# Patient Record
Sex: FEMALE
[De-identification: names, ages, dates, MRNs, and addresses within clinical notes are randomized per-mention and may not be internally consistent; named-entity substitution may affect disease eponyms.]

---

## 2018-01-26 ENCOUNTER — HOSPITAL ENCOUNTER (EMERGENCY)
Dept: HOSPITAL 14 - H.ER | Age: 38
Discharge: HOME | End: 2018-01-26
Payer: COMMERCIAL

## 2018-01-26 VITALS
OXYGEN SATURATION: 98 % | SYSTOLIC BLOOD PRESSURE: 121 MMHG | DIASTOLIC BLOOD PRESSURE: 75 MMHG | TEMPERATURE: 97.5 F | RESPIRATION RATE: 16 BRPM

## 2018-01-26 VITALS — HEART RATE: 93 BPM

## 2018-01-26 DIAGNOSIS — M54.9: ICD-10-CM

## 2018-01-26 DIAGNOSIS — O26.892: ICD-10-CM

## 2018-01-26 DIAGNOSIS — M79.1: ICD-10-CM

## 2018-01-26 DIAGNOSIS — Z3A.18: ICD-10-CM

## 2018-01-26 DIAGNOSIS — R05: Primary | ICD-10-CM

## 2018-01-26 NOTE — ED PDOC
HPI: General Adult


Time Seen by Provider: 18 15:54


Chief Complaint (Nursing): Flu-like Symptoms


Chief Complaint (Provider): Back Pain, Body Aches, Cough


History Per: Patient


History/Exam Limitations: no limitations


Current Symptoms Are (Timing): Still Present


Additional Complaint(s): 


37 year old female presents to the ED complaining of body aches, cough and 

lower back pain which began yesterday. The patient states that she was seen by 

her OBGYN and told to come into the emergency room.





PMD: Patrica Bella








Past Medical History


Reviewed: Historical Data, Nursing Documentation, Vital Signs


Vital Signs: 


 Last Vital Signs











Temp  97.5 F L  18 15:51


 


Pulse  108 H  18 15:51


 


Resp  16   18 15:51


 


BP  121/75   18 15:51


 


Pulse Ox  98   18 16:33














- Medical History


PMH: No Chronic Diseases





- Surgical History


Surgical History: No Surg Hx





- Family History


Family History: States: Unknown Family Hx





- Living Arrangements


Living Arrangements: With Family





- Social History


Current smoker - smoking cessation education provided: No


Ex-Smoker (has not smoked in the last 12 months): No


Alcohol: None





- Home Medications


Home Medications: 


 Ambulatory Orders











 Medication  Instructions  Recorded


 


Oseltamivir [Tamiflu] 75 mg PO BID #10 cap 18














- Allergies


Allergies/Adverse Reactions: 


 Allergies











Allergy/AdvReac Type Severity Reaction Status Date / Time


 


No Known Allergies Allergy   Verified 18 15:50














Review of Systems


ROS Statement: Except As Marked, All Systems Reviewed And Found Negative


Constitutional: Positive for: Other (body aches)


Respiratory: Positive for: Cough


Musculoskeletal: Positive for: Back Pain (lower)





Physical Exam





- Reviewed


Nursing Documentation Reviewed: Yes


Vital Signs Reviewed: Yes





- Physical Exam


Appears: Positive for: Uncomfortable


Head Exam: Positive for: ATRAUMATIC, NORMAL INSPECTION


Skin: Positive for: Normal Color, Warm, Dry.  Negative for: Rash


Eye Exam: Positive for: Normal appearance, EOMI, PERRL


ENT: Positive for: Normal ENT Inspection.  Negative for: Nasal Congestion, 

Tonsillar Exudate, Tonsillar Swelling


Neck: Positive for: Normal


Cardiovascular/Chest: Positive for: Regular Rate, Rhythm, Chest Non Tender.  

Negative for: Tachycardia


Respiratory: Positive for: Normal Breath Sounds.  Negative for: Rales, Rhonchi, 

Wheezing, Respiratory Distress


Back: Positive for: Normal Inspection


Extremity: Positive for: Normal ROM


Neurologic/Psych: Positive for: Alert, Oriented, Gait





- ECG


O2 Sat by Pulse Oximetry: 98 (RA)


Pulse Ox Interpretation: Normal





Medical Decision Making


Medical Decision Makin


Initial Impression


38 y/o female presenting with cough, lower back pain and body aches





Initial Plan:


* Influenza A B


* US OB pregnancy


* Reevaluation








(+) IUP with FHT





Case discussed with Dr. Valdes





____________________________________________________________


Documented by Amy Marquez acting as a scribe for Margi Davidson PA-C.





All medical record entries made by the Scribe were at my direction and 

personally dictated by me. I have reviewed the chart and agree that the record 

accurately reflects my personal performance of the history, physical exam, 

medical decision making, and the department course for this patient. I have 

also personally directed, reviewed, and agree with the discharge instructions 

and disposition. 




















Disposition





- Clinical Impression


Clinical Impression: 


 Influenza








- Patient ED Disposition


Is Patient to be Admitted: No


Counseled Patient/Family Regarding: Diagnosis, Need For Followup





- Disposition


Disposition: Routine/Home


Disposition Time: 18:21


Condition: GOOD


Prescriptions: 


Oseltamivir [Tamiflu] 75 mg PO BID #10 cap


Instructions:  Influenza (ED)


Forms:  [facility] (English)

## 2018-01-29 NOTE — US
OB pregnancy, limited 



Comparison: None available 



Technique: Real-time ultrasound was performed through the pelvis. 



Findings: 



There is a single living fetus in breech presentation. Anterior 

placenta. The placenta is not previa. Bilateral ovaries are not 

identified. There are no adnexal masses or cysts evident. Cervix 

length measures approximately 5.0 cm and appears closed. 



Measurements and calculations: 



Fetus has a composite sonographic age of 16 weeks 1 day.  This 

calculation is based on the biparietal diameter, head circumference, 

abdominal circumference, and femur length. 



Estimated fetal heart rate 162.3 beats per min. 



Impression: 



Single living fetus with a composite sonographic age of 16 weeks 1 

day. 



Estimated fetal heart rate 162.3 beats per min. 



Advise an anomaly screen at 16-18 weeks gestational age.

## 2018-07-04 ENCOUNTER — HOSPITAL ENCOUNTER (INPATIENT)
Dept: HOSPITAL 14 - H.EROB2 | Age: 38
LOS: 3 days | Discharge: HOME | End: 2018-07-07
Attending: OBSTETRICS & GYNECOLOGY | Admitting: OBSTETRICS & GYNECOLOGY
Payer: COMMERCIAL

## 2018-07-04 VITALS — BODY MASS INDEX: 29.9 KG/M2

## 2018-07-04 DIAGNOSIS — Z3A.39: ICD-10-CM

## 2018-07-04 LAB
BASOPHILS # BLD AUTO: 0.1 K/UL (ref 0–0.2)
BASOPHILS NFR BLD: 0.5 % (ref 0–2)
EOSINOPHIL # BLD AUTO: 0 K/UL (ref 0–0.7)
EOSINOPHIL NFR BLD: 0.3 % (ref 0–4)
ERYTHROCYTE [DISTWIDTH] IN BLOOD BY AUTOMATED COUNT: 13.9 % (ref 11.5–14.5)
HGB BLD-MCNC: 10 G/DL (ref 12–16)
LYMPHOCYTES # BLD AUTO: 2.5 K/UL (ref 1–4.3)
LYMPHOCYTES NFR BLD AUTO: 17.6 % (ref 20–40)
MCH RBC QN AUTO: 30.3 PG (ref 27–31)
MCHC RBC AUTO-ENTMCNC: 33.4 G/DL (ref 33–37)
MCV RBC AUTO: 90.7 FL (ref 81–99)
MONOCYTES # BLD: 1 K/UL (ref 0–0.8)
MONOCYTES NFR BLD: 6.7 % (ref 0–10)
NEUTROPHILS # BLD: 10.8 K/UL (ref 1.8–7)
NEUTROPHILS NFR BLD AUTO: 74.9 % (ref 50–75)
NRBC BLD AUTO-RTO: 0.1 % (ref 0–0)
PLATELET # BLD: 276 K/UL (ref 130–400)
PMV BLD AUTO: 10.6 FL (ref 7.2–11.7)
RBC # BLD AUTO: 3.3 MIL/UL (ref 3.8–5.2)
WBC # BLD AUTO: 14.4 K/UL (ref 4.8–10.8)

## 2018-07-04 PROCEDURE — 4A1HXCZ MONITORING OF PRODUCTS OF CONCEPTION, CARDIAC RATE, EXTERNAL APPROACH: ICD-10-PCS | Performed by: OBSTETRICS & GYNECOLOGY

## 2018-07-04 NOTE — OBHP
===========================

Datetime: 2018 18:23

===========================

   

IP Adm Impression:  Term, intrauterine pregnancy; No Active Labor

IP Admit Plan:  Observation/Evaluation

Admit Comment, IP Provider:  Patient is a 36 y/o  ega 39 weeks c/o uterine contractions x several
 hours duration. Pt reports recent coral of diarrhea and upset stomach. Patient PNC unremarkbale. Pt re
ports uterine contraction no lof good fm.

   PMH depression ADHD

   meds PNV

   social deneis etoh tob

   NKDA

      

   ROS as per HPI otherwise neg

      

   vss afebrile

   p/e see notes

      

   IUP at 38 weeks

   early labor observtion

   routine labs IVF hydration

   vertex presentaton

Pelvic Type - PN:  Adequate

Extremities - PN:  Normal

Abdomen - PN:  Normal

Back - PN:  Not Done

Breast - PN:  Not Done

Lungs - PN:  Normal

Heart - PN:  Normal

Thyroid - PN:  Normal

Neurologic - PN:  Normal

HEENT - PN:  Normal

General - PN:  Normal

Fetal Weight - Estimated:  71/2

Fetal Presentation-Admit:  Vertex

FHR - Baseline A Provider:  150

Gestation - Est Wks by US:  39.0

Pool Provider:  Negative

EGA AdmitDate IP:  39.0

Vital Signs Provider:  Reviewed

IP Chief Complaint:  Uterine contractions

NICHD Variability Prov Fetus A:  Moderate 6-25bpm

NICHD Accel Fetus A IP Provider:  15X15

FHR Category Provider Fetus A:  Category I

NICHD Decel Fetus A IP Provider:  None

Dilatation, Provider:  1

Effacement, Provider:  90

Station, Provider:  -2

Genitourinary Exam:  Normal

DTRs - PN:  Normal

## 2018-07-05 VITALS — TEMPERATURE: 98.3 F

## 2018-07-05 LAB
BASOPHILS # BLD AUTO: 0 K/UL (ref 0–0.2)
BASOPHILS NFR BLD: 0.2 % (ref 0–2)
EOSINOPHIL # BLD AUTO: 0 K/UL (ref 0–0.7)
EOSINOPHIL NFR BLD: 0.2 % (ref 0–4)
ERYTHROCYTE [DISTWIDTH] IN BLOOD BY AUTOMATED COUNT: 14.2 % (ref 11.5–14.5)
HGB BLD-MCNC: 8.8 G/DL (ref 12–16)
LYMPHOCYTES # BLD AUTO: 2.9 K/UL (ref 1–4.3)
LYMPHOCYTES NFR BLD AUTO: 16.1 % (ref 20–40)
MCH RBC QN AUTO: 30.5 PG (ref 27–31)
MCHC RBC AUTO-ENTMCNC: 33.2 G/DL (ref 33–37)
MCV RBC AUTO: 91.8 FL (ref 81–99)
MONOCYTES # BLD: 1.4 K/UL (ref 0–0.8)
MONOCYTES NFR BLD: 7.9 % (ref 0–10)
NEUTROPHILS # BLD: 13.6 K/UL (ref 1.8–7)
NEUTROPHILS NFR BLD AUTO: 75.6 % (ref 50–75)
NRBC BLD AUTO-RTO: 0 % (ref 0–0)
PLATELET # BLD: 206 K/UL (ref 130–400)
PMV BLD AUTO: 10.1 FL (ref 7.2–11.7)
RBC # BLD AUTO: 2.89 MIL/UL (ref 3.8–5.2)
WBC # BLD AUTO: 18 K/UL (ref 4.8–10.8)

## 2018-07-05 PROCEDURE — 0KQM0ZZ REPAIR PERINEUM MUSCLE, OPEN APPROACH: ICD-10-PCS | Performed by: OBSTETRICS & GYNECOLOGY

## 2018-07-05 RX ADMIN — OXYCODONE HYDROCHLORIDE AND ACETAMINOPHEN PRN TAB: 5; 325 TABLET ORAL at 09:06

## 2018-07-05 RX ADMIN — OXYCODONE HYDROCHLORIDE AND ACETAMINOPHEN PRN TAB: 5; 325 TABLET ORAL at 16:06

## 2018-07-05 RX ADMIN — OXYCODONE HYDROCHLORIDE AND ACETAMINOPHEN PRN TAB: 5; 325 TABLET ORAL at 21:30

## 2018-07-05 RX ADMIN — Medication PRN SPRAYS: at 09:00

## 2018-07-05 NOTE — OBPPN
===========================

Datetime: 2018 06:27

===========================

   

PP Pain Prov:  Within normal limits

PP Nausea Prov:  Denies

PP Flatus Prov:  Yes

PP BM Prov:  Yes

PP Heart Prov:  Normal

PP Lungs Prov:  Normal

PP Abdomen/Uterus Prov:  Normal

PP Lochia Prov:  Normal

PP Extremities Prov:  Normal

PP Breastfeeding Progress Prov:  Normal

PP Impression Prov:  Normal postpartum progression

PP Plan Prov:  Continue present management

PP Progress Note Prov:  38 y/o female  s/p  on 2018 at 12:19am.  Patient was seen and
 examined at bedside this AM. Patient remains afebrile, reports mild abdominal pain, which is well co
ntrolled with pain meds. Patient is ambulating and breast feeding without difficulty. Lochia is simil
ar to menses. Patient has voided, +Flatus _-BM.  Denies fever, chills, chest pain, dyspnea or dizzine
ss. 

      

   GEN: Well-appearing

   Cardio: S1/S2 auscultated, no murmurs

   Lungs: clear breath sounds b/l, no adventitious sounds auscultated

   Abdomen: BS appreciated; Uterus is firm and at the level of the umbilicus.

   Ext: calves are non-tender

   NEURO: AAOx3

      

   A/P

      

   1.Encourage breastfeeding and ambulating.

   2.Ibuprofen 600mg q6 prn for pain.

   3.Anticipated d/c home- 2018.

      

   Case discussed with OB Attending.

   ** Leandra Juarez PGY-1

      

   Addendum by Dr. Bella: Patient evaluated independently and I agree with the above.

## 2018-07-05 NOTE — OBADHP
===========================

Datetime: 2018 18:23

===========================

   

Admit Comment, IP Provider:  Patient is a 36 y/o  ega 39 weeks c/o uterine contractions x several
 hours duration. Pt reports recent coral of diarrhea and upset stomach. Patient PNC unremarkbale. Pt re
ports uterine contraction no lof good fm.

   PMH depression ADHD

   meds PNV

   social deneis etoh tob

   NKDA

      

   ROS as per HPI otherwise neg

      

   vss afebrile

   p/e see notes

      

   IUP at 38 weeks

   early labor observtion

   routine labs IVF hydration

   vertex presentaton

   adequate pelvis

   anticipate 

Pelvic Type - PN:  Adequate

Extremities - PN:  Normal

Abdomen - PN:  Normal

Back - PN:  Not Done

Breast - PN:  Not Done

Lungs - PN:  Normal

Heart - PN:  Normal

Thyroid - PN:  Normal

Neurologic - PN:  Normal

HEENT - PN:  Normal

General - PN:  Normal

Fetal Weight - Estimated:  71/2

Fetal Presentation-Admit:  Vertex

FHR - Baseline A Provider:  150

Gestation - Est Wks by US:  39.0

Pool Provider:  Negative

Vital Signs Provider:  Reviewed

IP Chief Complaint:  Uterine contractions

NICHD Variability Prov Fetus A:  Moderate 6-25bpm

NICHD Accel Fetus A IP Provider:  15X15

FHR Category Provider Fetus A:  Category I

NICHD Decel Fetus A IP Provider:  None

Dilatation, Provider:  1

Effacement, Provider:  90

Station, Provider:  -2

Genitourinary Exam:  Normal

DTRs - PN:  Normal

EGA AdmitDate IP:  39.0

IP Adm Impression:  Term, intrauterine pregnancy; No Active Labor

IP Admit Plan:  Observation/Evaluation

## 2018-07-05 NOTE — OBDS
===================================

DELIVERY PERSONNEL

===================================

   

Delivery Doctor:  LUCILA Kidd MD

Circulator:  Jennifer Marquez RN

Anesthesiologist:  EMMA Le MD

Resident:  Leandra Juarez

   

===================================

MATERNAL INFORMATION

===================================

   

Delivery Anesthesia:  Epidural

Medications in Delivery:  Pitocin

Estimated  Blood Loss (ml):  100

Placenta Cultured:  No

Maternal Complications:  None

Provider Comments:  delivered live baby girl at 12:19 am the baby was bulb suctioned on the perineum 
and transferred to [facility]. There was one loose nuchal cord. The cord was clamped and cut 3 v
essels noted. Cord blod was obtained and sent to the lab. The plcenta was delivered at 12:24 am, the 
QBL was 100 cc there was a first degee lacereation which was repaired with 2.0 rapide. The mother natalia
erated the procedure well the baby went to well baby nursery with [facility]GAR 9/9 peds in attendance due to
 meconium

   

===================================

LABOR SUMMARY

===================================

   

EDC:  2018 00:00

No. Babies in Womb:  1

 Attempted:  No

Labor Anesthesia:  Epidural

   

===================================

LABOR INFORMATION

===================================

   

Reason for Induction:  Not Applicable

Onset of Labor:  2018 20:20

Complete Dilatation:  2018 21:58

Oxytocin:  N/A

Group B Beta Strep:  Negative

Antibiotics # of Doses:  0

Antibiotics Time of Last Dose:  n/a

Steroids Given:  None

Reason Steroids Not Administered:  Not Applicable

   

===================================

MEMBRANES

===================================

   

Membranes Rupture Method:  Artificial

Rupture of Membranes:  2018 21:58

Length of Rupture (hrs):  2.35

Amniotic Fluid Color:  Light Meconium

Amniotic Fluid Amount:  Moderate

Amniotic Fluid Odor:  Normal

   

===================================

STAGES OF LABOR

===================================

   

Stage 1 hrs:  1

Stage 1 min:  38

Stage 2 hrs:  2

Stage 2 min:  21

Stage 3 hrs:  0

Stage 3 min:  5

Total Time in Labor hrs:  4

Total Time in Labor min:  4

   

===================================

VAGINAL DELIVERY

===================================

   

Episiotomy:  None

Laceration Extension:  Second Degree

Laceration Type:  Vaginal

Laceration Repair:  Yes

Initial Vag Sponge Count:  15

Final Vag Sponge Count:  15

Initial Vag Sharps Count:  1

Final Vag Sharps Count:  1

Sponge Count Correct:  Yes

Sharps Count Correct:  Yes

Count Comment:  count correct

   

===================================

BABY A INFORMATION

===================================

   

Infant Delivery Date/Time:  2018 00:19

Method of Delivery:  Vaginal

Born in Route :  No

:  N/A

Forceps:  N/A

Vacuum Extraction:  N/A

Shoulder Dystocia :  No

   

===================================

SHOULDER DYSTOCIA BABY A

===================================

   

Infant Delivery Date/Time:  2018 00:19

   

===================================

PRESENTATION/POSITION BABY A

===================================

   

Presentation:  Cephalic

   

===================================

PLACENTA INFORMATION BABY A

===================================

   

Placenta Delivery Time :  2018 00:24

Placenta Method of Delivery:  Spontaneous

Placenta Status:  Delivered

   

===================================

APGAR SCORES BABY A

===================================

   

Heart Rate 1 min:  >100 bpm

Resp Effort 1 min:  Good Cry

Reflex Irritability 1 min:  Cough or Sneeze or Pulls Away

Muscle Tone 1 min:  Active Motion

Color 1 min:  Body Pink, Extremities Blue

Resuscitation Effort 1 min:  Tactile Stimulation

APGAR SCORE 1 MIN:  9

Heart Rate 5 min:  >100 bpm

Resp Effort 5 min:  Good Cry

Reflex Irritability 5 min:  Cough or Sneeze or Pulls Away

Muscle Tone 5 min:  Active Motion

Color 5 min:  Body Pink, Extremities Blue

Resuscitation Effort 5 min:  N/A

APGAR SCORE 5 MIN:  9

   

===================================

INFANT INFORMATION BABY A

===================================

   

Gestational Age at Delivery:  39.0

Gestational Status:  Term

Infant Outcome :  Liveborn

Infant Condition :  Stable

Infant Sex:  Female

   

===================================

IDENTIFICATION/MEDS BABY A

===================================

   

ID Band Number:  [id number]

ID Band Location:  Left Leg; Left Arm



   

===================================

WEIGHT/LENGTH BABY A

===================================

   

Infant Birthweight (gms):  3165

Infant Weight (lb):  7

Infant Weight (oz):  0

   

===================================

CORD INFORMATION BABY A

===================================

   

No. Cord Vessels:  3

Nuchal Cord :  Around Neck x1, Loose

Cord Blood Taken:  Yes

Infant Suction:  Mouth; Nose

   

===================================

ASSESSMENT BABY A

===================================

   

Infant Complications:  Meconium

Physical Findings at Delivery:  Within Normal Limits

Infant Respirations:  Appears Normal

Neonatologist/ALS Called :  No

Infant Care By:  Zenia/Dr Christian

Transferred To:  Remains with Mother

## 2018-07-06 RX ADMIN — Medication PRN SPRAYS: at 16:10

## 2018-07-06 RX ADMIN — OXYCODONE HYDROCHLORIDE AND ACETAMINOPHEN PRN TAB: 5; 325 TABLET ORAL at 16:09

## 2018-07-06 RX ADMIN — OXYCODONE HYDROCHLORIDE AND ACETAMINOPHEN PRN TAB: 5; 325 TABLET ORAL at 08:30

## 2018-07-06 RX ADMIN — OXYCODONE HYDROCHLORIDE AND ACETAMINOPHEN PRN TAB: 5; 325 TABLET ORAL at 21:06

## 2018-07-06 NOTE — OBPPN
===========================

Datetime: 07/06/2018 10:47

===========================

   

PP Pain Prov:  Within normal limits

PP Nausea Prov:  Denies

PP Flatus Prov:  Yes

PP BM Prov:  Yes

PP Breasts Prov:  Normal

PP Heart Prov:  Normal

PP Lungs Prov:  Normal

PP Abdomen/Uterus Prov:  Normal

PP Vulva/Perineum Prov:  Normal

PP Extremities Prov:  Normal

PP Breastfeeding Progress Prov:  Normal

PP Comments Phys Exam Prov:  Perineum: slight swelling and tenderness along suture site- wnl

PP Impression Prov:  Normal postpartum progression

PP Plan Prov:  Continue present management

PP Progress Note Prov:  s: states has pain per perineum and requires percocet for alleviation; denies
 constipation and states she had diarrhea prior to admission

   hgb 9.8

   i: s/p vd w/ 1st degree laceration 

   p: pt advised of addictive qualities of percocet and to take sparingly. also advised of constipati
on issurs which pt states she is not concerned about due to diarrhea she prior to presentation to [facility]
c

   dermoplast; peribottle; motrin

   d/w pt sitz baths.

      

IP PP Procedures:  None

Vital Signs Provider PP:  Within Normal Limits

## 2018-07-07 VITALS
DIASTOLIC BLOOD PRESSURE: 71 MMHG | OXYGEN SATURATION: 99 % | HEART RATE: 72 BPM | SYSTOLIC BLOOD PRESSURE: 124 MMHG | RESPIRATION RATE: 20 BRPM

## 2018-07-07 RX ADMIN — OXYCODONE HYDROCHLORIDE AND ACETAMINOPHEN PRN TAB: 5; 325 TABLET ORAL at 08:29

## 2018-07-07 RX ADMIN — OXYCODONE HYDROCHLORIDE AND ACETAMINOPHEN PRN TAB: 5; 325 TABLET ORAL at 01:43

## 2018-07-07 NOTE — OBDCSUM
===========================

Datetime: 07/07/2018 08:26

===========================

   

Discharged to, Provider:  Home

Follow up at, Provider:  [facility]

Disch Instr Activity:  Normal activity; May Shower

Disch Instr Diet:  Regular

Discharge Instructions, Provider:  Routine instructions given

Discharge Diagnosis, Provider:  Term Pregnancy Delivered

Discharge Time:  07/07/2018 08:26

Follow up in weeks, Provider:  5 weeks

Contraception discussed, Prov:  No

Disch Activity Restrictions:  No exercising; No lifting; No sexual activity; Nothing in vagina - Inte
rcourse, tampons, douche

## 2018-07-07 NOTE — OBPPN
===========================

Datetime: 2018 08:21

===========================

   

PP Pain Prov:  Within normal limits

PP Abdomen/Uterus Prov:  Normal

PP Lochia Prov:  Normal

PP Breastfeeding Progress Prov:  Normal

PP Impression Prov:  Normal postpartum progression

PP Plan Prov:  Discharge

PP Progress Note Prov:  PPD 2 s/p , doing well, breast and bottle feeding 

   Rx's motrin and percocet given (pt is taking perocet for perineal pain)'

   Pt reports that she has iron supplement at home

   Discharge home today  

Vital Signs Provider PP:  Reviewed